# Patient Record
Sex: MALE | Race: WHITE | NOT HISPANIC OR LATINO | Employment: FULL TIME | URBAN - METROPOLITAN AREA
[De-identification: names, ages, dates, MRNs, and addresses within clinical notes are randomized per-mention and may not be internally consistent; named-entity substitution may affect disease eponyms.]

---

## 2017-12-20 RX ORDER — LISINOPRIL AND HYDROCHLOROTHIAZIDE 12.5; 1 MG/1; MG/1
1 TABLET ORAL DAILY
Qty: 90 TABLET | Refills: 1 | Status: SHIPPED | OUTPATIENT
Start: 2017-12-20

## 2021-06-09 PROBLEM — G62.9 POLYNEUROPATHY: Status: ACTIVE | Noted: 2021-06-09

## 2021-06-09 PROBLEM — G47.62 SLEEP RELATED LEG CRAMPS: Status: ACTIVE | Noted: 2021-06-09

## 2021-09-10 PROBLEM — G20 PARKINSON'S DISEASE (HCC): Status: ACTIVE | Noted: 2021-09-10

## 2023-08-03 LAB — PROSTATE SPECIFIC AG (NG/ML) IN SER/PLAS: 0.51 NG/ML (ref 0–4)

## 2023-11-09 ENCOUNTER — LAB (OUTPATIENT)
Dept: LAB | Facility: LAB | Age: 63
End: 2023-11-09
Payer: COMMERCIAL

## 2023-11-09 DIAGNOSIS — E78.5 HYPERLIPIDEMIA, UNSPECIFIED: ICD-10-CM

## 2023-11-09 DIAGNOSIS — E11.65 TYPE 2 DIABETES MELLITUS WITH HYPERGLYCEMIA (MULTI): Primary | ICD-10-CM

## 2023-11-09 LAB
ALBUMIN SERPL BCP-MCNC: 4.6 G/DL (ref 3.4–5)
ALP SERPL-CCNC: 60 U/L (ref 33–136)
ALT SERPL W P-5'-P-CCNC: 155 U/L (ref 10–52)
ANION GAP SERPL CALC-SCNC: 12 MMOL/L (ref 10–20)
AST SERPL W P-5'-P-CCNC: 74 U/L (ref 9–39)
BILIRUB SERPL-MCNC: 1.3 MG/DL (ref 0–1.2)
BUN SERPL-MCNC: 25 MG/DL (ref 6–23)
C PEPTIDE SERPL-MCNC: 1.5 NG/ML (ref 0.7–3.9)
CALCIUM SERPL-MCNC: 9.6 MG/DL (ref 8.6–10.3)
CHLORIDE SERPL-SCNC: 103 MMOL/L (ref 98–107)
CHOLEST SERPL-MCNC: 184 MG/DL (ref 0–199)
CHOLESTEROL/HDL RATIO: 3.6
CO2 SERPL-SCNC: 27 MMOL/L (ref 21–32)
CREAT SERPL-MCNC: 0.79 MG/DL (ref 0.5–1.3)
CREAT UR-MCNC: 81.3 MG/DL (ref 20–370)
FOLATE SERPL-MCNC: 10.7 NG/ML
GFR SERPL CREATININE-BSD FRML MDRD: >90 ML/MIN/1.73M*2
GLUCOSE SERPL-MCNC: 89 MG/DL (ref 74–99)
HDLC SERPL-MCNC: 50.7 MG/DL
LDLC SERPL CALC-MCNC: 117 MG/DL
MICROALBUMIN UR-MCNC: 8.2 MG/L
MICROALBUMIN/CREAT UR: 10.1 UG/MG CREAT
NON HDL CHOLESTEROL: 133 MG/DL (ref 0–149)
POTASSIUM SERPL-SCNC: 4 MMOL/L (ref 3.5–5.3)
PROT SERPL-MCNC: 6.5 G/DL (ref 6.4–8.2)
SODIUM SERPL-SCNC: 138 MMOL/L (ref 136–145)
TRIGL SERPL-MCNC: 83 MG/DL (ref 0–149)
TSH SERPL-ACNC: 1.96 MIU/L (ref 0.44–3.98)
VIT B12 SERPL-MCNC: 272 PG/ML (ref 211–911)
VLDL: 17 MG/DL (ref 0–40)

## 2023-11-09 PROCEDURE — 80053 COMPREHEN METABOLIC PANEL: CPT

## 2023-11-09 PROCEDURE — 82570 ASSAY OF URINE CREATININE: CPT

## 2023-11-09 PROCEDURE — 83036 HEMOGLOBIN GLYCOSYLATED A1C: CPT

## 2023-11-09 PROCEDURE — 82746 ASSAY OF FOLIC ACID SERUM: CPT

## 2023-11-09 PROCEDURE — 36415 COLL VENOUS BLD VENIPUNCTURE: CPT

## 2023-11-09 PROCEDURE — 80061 LIPID PANEL: CPT

## 2023-11-09 PROCEDURE — 82043 UR ALBUMIN QUANTITATIVE: CPT

## 2023-11-09 PROCEDURE — 84443 ASSAY THYROID STIM HORMONE: CPT

## 2023-11-09 PROCEDURE — 82607 VITAMIN B-12: CPT

## 2023-11-09 PROCEDURE — 84681 ASSAY OF C-PEPTIDE: CPT

## 2023-11-10 LAB
EST. AVERAGE GLUCOSE BLD GHB EST-MCNC: 140 MG/DL
HBA1C MFR BLD: 6.5 %

## 2023-12-15 ENCOUNTER — OFFICE (OUTPATIENT)
Dept: URBAN - METROPOLITAN AREA CLINIC 26 | Facility: CLINIC | Age: 63
End: 2023-12-15
Payer: COMMERCIAL

## 2023-12-15 VITALS
TEMPERATURE: 97.7 F | HEIGHT: 72 IN | WEIGHT: 273 LBS | SYSTOLIC BLOOD PRESSURE: 94 MMHG | DIASTOLIC BLOOD PRESSURE: 58 MMHG | HEART RATE: 65 BPM

## 2023-12-15 DIAGNOSIS — R11.0 NAUSEA: ICD-10-CM

## 2023-12-15 DIAGNOSIS — R10.13 EPIGASTRIC PAIN: ICD-10-CM

## 2023-12-15 DIAGNOSIS — R79.89 OTHER SPECIFIED ABNORMAL FINDINGS OF BLOOD CHEMISTRY: ICD-10-CM

## 2023-12-15 PROBLEM — R74.8 ELEVATED CK: Status: ACTIVE | Noted: 2019-09-18

## 2023-12-15 PROBLEM — G56.00 CARPAL TUNNEL SYNDROME: Status: ACTIVE | Noted: 2017-06-22

## 2023-12-15 PROBLEM — G20.A1 PARKINSON'S DISEASE (MULTI): Status: ACTIVE | Noted: 2021-09-10

## 2023-12-15 PROBLEM — I25.10 CALCIFICATION OF CORONARY ARTERY: Status: ACTIVE | Noted: 2022-12-20

## 2023-12-15 PROBLEM — G47.62 SLEEP RELATED LEG CRAMPS: Status: ACTIVE | Noted: 2021-06-09

## 2023-12-15 PROBLEM — M79.604 PAIN IN BOTH LOWER EXTREMITIES: Status: ACTIVE | Noted: 2023-12-15

## 2023-12-15 PROBLEM — R07.9 CHEST PAIN AT REST: Status: ACTIVE | Noted: 2023-12-15

## 2023-12-15 PROBLEM — R49.0 CHRONIC HOARSENESS: Status: ACTIVE | Noted: 2023-03-13

## 2023-12-15 PROBLEM — F51.04 CHRONIC INSOMNIA: Status: ACTIVE | Noted: 2023-03-13

## 2023-12-15 PROBLEM — R52 GENERALIZED PAIN: Status: ACTIVE | Noted: 2019-09-18

## 2023-12-15 PROBLEM — E66.9 OBESITY, CLASS II, BMI 35-39.9: Status: ACTIVE | Noted: 2022-12-28

## 2023-12-15 PROBLEM — K21.9 GASTROESOPHAGEAL REFLUX DISEASE: Status: ACTIVE | Noted: 2022-12-20

## 2023-12-15 PROBLEM — M79.605 PAIN IN BOTH LOWER EXTREMITIES: Status: ACTIVE | Noted: 2023-12-15

## 2023-12-15 PROBLEM — F10.10 ALCOHOL CONSUMPTION BINGE DRINKING: Status: ACTIVE | Noted: 2019-09-19

## 2023-12-15 PROBLEM — G62.9 POLYNEUROPATHY: Status: ACTIVE | Noted: 2021-06-09

## 2023-12-15 PROBLEM — I10 HYPERTENSION: Status: ACTIVE | Noted: 2018-02-08

## 2023-12-15 PROBLEM — I42.0 DILATED CARDIOMYOPATHY (MULTI): Status: ACTIVE | Noted: 2023-12-15

## 2023-12-15 PROBLEM — M11.20 CHONDROCALCINOSIS DUE TO DICALCIUM PHOSPHATE CRYSTALS: Status: ACTIVE | Noted: 2017-05-08

## 2023-12-15 PROBLEM — J34.2 DEVIATED NASAL SEPTUM: Status: ACTIVE | Noted: 2023-03-13

## 2023-12-15 PROBLEM — R53.83 FATIGUE: Status: ACTIVE | Noted: 2023-12-15

## 2023-12-15 PROBLEM — M54.12 CERVICAL RADICULOPATHY: Status: ACTIVE | Noted: 2018-08-23

## 2023-12-15 PROBLEM — R94.39 ABNORMAL NUCLEAR STRESS TEST: Status: ACTIVE | Noted: 2023-12-15

## 2023-12-15 PROBLEM — G47.33 OBSTRUCTIVE SLEEP APNEA, ADULT: Status: ACTIVE | Noted: 2019-09-19

## 2023-12-15 PROBLEM — R93.1 HIGH CORONARY ARTERY CALCIUM SCORE: Status: ACTIVE | Noted: 2023-12-15

## 2023-12-15 PROBLEM — F41.9 ANXIETY: Status: ACTIVE | Noted: 2023-12-15

## 2023-12-15 PROBLEM — M54.2 NECK PAIN: Status: ACTIVE | Noted: 2023-12-15

## 2023-12-15 PROBLEM — R06.02 SHORTNESS OF BREATH AT REST: Status: ACTIVE | Noted: 2023-12-15

## 2023-12-15 PROBLEM — N40.0 BENIGN PROSTATIC HYPERPLASIA: Status: ACTIVE | Noted: 2023-03-13

## 2023-12-15 PROBLEM — I25.84 CALCIFICATION OF CORONARY ARTERY: Status: ACTIVE | Noted: 2022-12-20

## 2023-12-15 PROCEDURE — 99213 OFFICE O/P EST LOW 20 MIN: CPT

## 2023-12-15 RX ORDER — FAMOTIDINE 40 MG/1
TABLET, FILM COATED ORAL
Qty: 30 | Refills: 3 | Status: COMPLETED
Start: 2023-12-15 | End: 2024-03-14

## 2023-12-18 ENCOUNTER — LAB (OUTPATIENT)
Dept: LAB | Facility: LAB | Age: 63
End: 2023-12-18
Payer: COMMERCIAL

## 2023-12-18 ENCOUNTER — OFFICE VISIT (OUTPATIENT)
Dept: CARDIOLOGY | Facility: CLINIC | Age: 63
End: 2023-12-18
Payer: COMMERCIAL

## 2023-12-18 ENCOUNTER — HOSPITAL ENCOUNTER (OUTPATIENT)
Dept: CARDIOLOGY | Facility: CLINIC | Age: 63
Discharge: HOME | End: 2023-12-18
Payer: COMMERCIAL

## 2023-12-18 VITALS
HEIGHT: 72 IN | BODY MASS INDEX: 36.84 KG/M2 | HEART RATE: 54 BPM | DIASTOLIC BLOOD PRESSURE: 53 MMHG | WEIGHT: 272 LBS | SYSTOLIC BLOOD PRESSURE: 93 MMHG | OXYGEN SATURATION: 98 %

## 2023-12-18 DIAGNOSIS — I10 PRIMARY HYPERTENSION: ICD-10-CM

## 2023-12-18 DIAGNOSIS — I42.0 DILATED CARDIOMYOPATHY (MULTI): Primary | ICD-10-CM

## 2023-12-18 DIAGNOSIS — I42.9 CARDIOMYOPATHY, UNSPECIFIED (MULTI): ICD-10-CM

## 2023-12-18 DIAGNOSIS — E78.2 MIXED HYPERLIPIDEMIA: ICD-10-CM

## 2023-12-18 DIAGNOSIS — R79.89 OTHER SPECIFIED ABNORMAL FINDINGS OF BLOOD CHEMISTRY: Primary | ICD-10-CM

## 2023-12-18 DIAGNOSIS — R11.0 NAUSEA: ICD-10-CM

## 2023-12-18 LAB
A1AT SERPL NEPH-MCNC: 144 MG/DL (ref 84–218)
ALBUMIN SERPL BCP-MCNC: 4.2 G/DL (ref 3.4–5)
ALP SERPL-CCNC: 69 U/L (ref 33–136)
ALT SERPL W P-5'-P-CCNC: 164 U/L (ref 10–52)
ANION GAP SERPL CALC-SCNC: 10 MMOL/L (ref 10–20)
AST SERPL W P-5'-P-CCNC: 61 U/L (ref 9–39)
BILIRUB SERPL-MCNC: 1.1 MG/DL (ref 0–1.2)
BUN SERPL-MCNC: 27 MG/DL (ref 6–23)
CALCIUM SERPL-MCNC: 9.4 MG/DL (ref 8.6–10.3)
CERULOPLASMIN SERPL-MCNC: 27.6 MG/DL (ref 20–60)
CHLORIDE SERPL-SCNC: 103 MMOL/L (ref 98–107)
CO2 SERPL-SCNC: 28 MMOL/L (ref 21–32)
CREAT SERPL-MCNC: 0.86 MG/DL (ref 0.5–1.3)
FERRITIN SERPL-MCNC: 368 NG/ML (ref 20–300)
GFR SERPL CREATININE-BSD FRML MDRD: >90 ML/MIN/1.73M*2
GLIADIN PEPTIDE IGA SER IA-ACNC: <1 U/ML
GLIADIN PEPTIDE IGG SER IA-ACNC: <1 U/ML
GLUCOSE SERPL-MCNC: 123 MG/DL (ref 74–99)
HAV IGM SER QL: NONREACTIVE
HBV CORE IGM SER QL: NONREACTIVE
HBV SURFACE AG SERPL QL IA: NONREACTIVE
HCV AB SER QL: NONREACTIVE
IRON SATN MFR SERPL: 39 % (ref 25–45)
IRON SERPL-MCNC: 166 UG/DL (ref 35–150)
POTASSIUM SERPL-SCNC: 4.3 MMOL/L (ref 3.5–5.3)
PROT SERPL-MCNC: 6.2 G/DL (ref 6.4–8.2)
SODIUM SERPL-SCNC: 137 MMOL/L (ref 136–145)
TIBC SERPL-MCNC: 425 UG/DL (ref 240–445)
TTG IGA SER IA-ACNC: <1 U/ML
TTG IGG SER IA-ACNC: <1 U/ML
UIBC SERPL-MCNC: 259 UG/DL (ref 110–370)

## 2023-12-18 PROCEDURE — 3074F SYST BP LT 130 MM HG: CPT | Performed by: NURSE PRACTITIONER

## 2023-12-18 PROCEDURE — 99213 OFFICE O/P EST LOW 20 MIN: CPT | Performed by: NURSE PRACTITIONER

## 2023-12-18 PROCEDURE — 3078F DIAST BP <80 MM HG: CPT | Performed by: NURSE PRACTITIONER

## 2023-12-18 PROCEDURE — 99213 OFFICE O/P EST LOW 20 MIN: CPT | Mod: 25 | Performed by: NURSE PRACTITIONER

## 2023-12-18 PROCEDURE — 86038 ANTINUCLEAR ANTIBODIES: CPT

## 2023-12-18 PROCEDURE — 83550 IRON BINDING TEST: CPT

## 2023-12-18 PROCEDURE — 93005 ELECTROCARDIOGRAM TRACING: CPT | Performed by: NURSE PRACTITIONER

## 2023-12-18 PROCEDURE — 86381 MITOCHONDRIAL ANTIBODY EACH: CPT

## 2023-12-18 PROCEDURE — 80053 COMPREHEN METABOLIC PANEL: CPT

## 2023-12-18 PROCEDURE — 3044F HG A1C LEVEL LT 7.0%: CPT | Performed by: NURSE PRACTITIONER

## 2023-12-18 PROCEDURE — 80074 ACUTE HEPATITIS PANEL: CPT

## 2023-12-18 PROCEDURE — 93306 TTE W/DOPPLER COMPLETE: CPT

## 2023-12-18 PROCEDURE — 82728 ASSAY OF FERRITIN: CPT

## 2023-12-18 PROCEDURE — 93010 ELECTROCARDIOGRAM REPORT: CPT | Performed by: INTERNAL MEDICINE

## 2023-12-18 PROCEDURE — 82390 ASSAY OF CERULOPLASMIN: CPT

## 2023-12-18 PROCEDURE — 82103 ALPHA-1-ANTITRYPSIN TOTAL: CPT

## 2023-12-18 PROCEDURE — 3061F NEG MICROALBUMINURIA REV: CPT | Performed by: NURSE PRACTITIONER

## 2023-12-18 PROCEDURE — 93306 TTE W/DOPPLER COMPLETE: CPT | Performed by: STUDENT IN AN ORGANIZED HEALTH CARE EDUCATION/TRAINING PROGRAM

## 2023-12-18 PROCEDURE — 36415 COLL VENOUS BLD VENIPUNCTURE: CPT

## 2023-12-18 PROCEDURE — 3049F LDL-C 100-129 MG/DL: CPT | Performed by: NURSE PRACTITIONER

## 2023-12-18 PROCEDURE — 83540 ASSAY OF IRON: CPT

## 2023-12-18 PROCEDURE — 86258 DGP ANTIBODY EACH IG CLASS: CPT

## 2023-12-18 PROCEDURE — 86364 TISS TRNSGLTMNASE EA IG CLAS: CPT

## 2023-12-18 RX ORDER — MORPHINE SULFATE 15 MG/1
15 TABLET, FILM COATED, EXTENDED RELEASE ORAL
COMMUNITY

## 2023-12-18 RX ORDER — LOSARTAN POTASSIUM AND HYDROCHLOROTHIAZIDE 12.5; 1 MG/1; MG/1
1 TABLET ORAL
COMMUNITY
End: 2024-04-22 | Stop reason: DRUGHIGH

## 2023-12-18 RX ORDER — METHOCARBAMOL 500 MG/1
500 TABLET, FILM COATED ORAL
COMMUNITY
Start: 2021-04-01 | End: 2024-04-22 | Stop reason: ALTCHOICE

## 2023-12-18 RX ORDER — METFORMIN HYDROCHLORIDE 1000 MG/1
1000 TABLET ORAL
COMMUNITY
Start: 2018-11-20

## 2023-12-18 RX ORDER — GLIMEPIRIDE 4 MG/1
2 TABLET ORAL
COMMUNITY
End: 2024-01-08 | Stop reason: WASHOUT

## 2023-12-18 NOTE — PROGRESS NOTES
Stalin Shah is a 63 y.o. male     History Of Present Illness   Mr Shah is a 63 year old morbidly obese male with dilated cardiomyopathy, T2DM with neuropathy, hypertension, hyperlipidemia, MATTHEW, BPH, chronic back pain, here for a follow up of his hypertension, hyperlipidemia, dilated cardiomyopathy . He reports excellent control of his BP since retiring. He is followed by sleep medicine for his MATTHEW.   Mr Shah is here for cardiac clearance for neck and back surgery.  He denies any complaints of chest pain shortness of breath, lower extremity edema, dizziness or syncope.  Approx. Two months ago, he was complaining of myalgia pain and his statin was stopped.  Most recently, he underwent labs and was found to have elevated liver enzymes.  He is now followed by GI and will be under going ultrasound.      Prior to this appt, he underwent an echocardiogram.   Social HX          Family HX  No family history on file.       Review Of Systems   Constitutional: feeling poorly, but~not feeling tired.   Eyes: no eyesight problems.   ENT: no hearing loss~and~no nosebleeds.   Cardiovascular: no intermittent leg claudication,~no chest pain,~no tightness or heavy pressure,~no shortness of breath,~no palpitations,~no lower extremity edema,~the heart rate was not slow~and~as noted in HPI.   Respiratory: no chronic cough,~no shortness of breath~and~no shortness of breath during exertion.   Gastrointestinal: + GERD, +abdominal discomfort  Genitourinary: no urinary frequency~and~no hematuria.   Musculoskeletal: arthralgias, + neck and back pain, limb pain~and~difficulty walking,   but~no limb swelling.   Skin: no skin rashes.   Neurological: no seizures,~no frequent falls,~no headaches,~no dizziness,~no tingling,~no numbness,~no fainting~and~no limb weakness.   Psychiatric: no depression~and~not suicidal.   All other systems have been reviewed and are negative for complaint.       Allergies  Allergies   Allergen Reactions     Lisinopril Unknown    Sitagliptin GI Upset          Vitals  There were no vitals taken for this visit.        Physical Exam  Constitutional: alert and in no acute distress.   Eyes: no erythema, swelling or discharge from the eye .   Neck: neck is supple, symmetric, trachea midline, no masses ~and~no thyromegaly .   Pulmonary: no increased work of breathing or signs of respiratory distress ~and~lungs clear to auscultation.  Auscultation of the lungs revealed no expiratory wheezing,~normal expiratory time~and~no inspiratory wheezing. no rales or crackles were heard bilaterally. no rhonchi. no friction rub. no wheezing. no diminished breath sounds. no bronchial breath sounds.   Cardiovascular: carotid pulses 2+ bilaterally with no bruit  right carotid pulse 2+,~no bruit heard over the right carotid,~left carotid pulse 2+~and~no bruit heard over the left carotid,~JVP was normal,~no thrills ,~regular rhythm, normal S1 and S2, no murmurs  the heart rate was normal normal S1,~normal S2,~no S3,~no S4 no murmurs were heard.,~pedal pulses 2+ bilaterally  posterior tibialis pulse was 2+ on the right,~posterior tibialis pulse was 2+ on the left,~dorsalis pedis pulse was 2+ on the right,~dorsalis pedis pulse was 2+ on the left,~no edema ~and~ varicosities noted bilaterally. Left > right.   Abdomen: abdomen non-tender, no masses ~and~no hepatomegaly .   Skin: skin warm and dry, normal skin turgor ~. skin tear to left lower leg.   Psychiatric judgment and insight is normal ~and~oriented to person, place and time .                 Labs     AST 74  BILIRUBIN 1.3      BUN 25  CR 0.79  GFR >90    GLUCOSE 89  K 4.0  EKG Findings  EK23 sinus bradycardia   Inferior infarct  56        Cardiac Service Results:  CARDIAC CATHETERIZATION:  1. Mild, nonobstructive coronary artery disease.   2. Minimally elevated left heart filling pressures.   3. Mild to moderately depressed LV systolic function.           Assessment/Plan       Mr Shah is here for cardiac clearance for neck and back surgery.  EKG shows sinus bradycardia -56  BP is 93/53.  He states he is taking his antihypertensive twice a day.   He is instructed to only take this once a day.  His liver enzymes are elevated and he will be under going a liver ultrasound.  Prior to this appt, he underwent an echocardiogram,.  Preliminary results show an EF of 52% and mild AI.    He does have known LVH/cardiomyopathy.  He is cleared for surgery with a low risk of cardiovascular events in the perioperative period.  He is to stop asa 5 days prior to his surgery.  He will follow up with me in April or sooner for any questions or concerns.

## 2023-12-19 LAB
ANA PATTERN: ABNORMAL
ANA SER QL HEP2 SUBST: POSITIVE
ANA TITR SER IF: ABNORMAL {TITER}
MITOCHONDRIA AB SER QL IF: NEGATIVE

## 2023-12-20 LAB
ATRIAL RATE: 56 BPM
P AXIS: 52 DEGREES
P OFFSET: 199 MS
P ONSET: 145 MS
PR INTERVAL: 158 MS
Q ONSET: 224 MS
QRS COUNT: 9 BEATS
QRS DURATION: 98 MS
QT INTERVAL: 426 MS
QTC CALCULATION(BAZETT): 411 MS
QTC FREDERICIA: 416 MS
R AXIS: 10 DEGREES
T AXIS: 18 DEGREES
T OFFSET: 437 MS
VENTRICULAR RATE: 56 BPM

## 2023-12-26 ENCOUNTER — OFFICE VISIT (OUTPATIENT)
Dept: SLEEP MEDICINE | Facility: CLINIC | Age: 63
End: 2023-12-26
Payer: COMMERCIAL

## 2023-12-26 DIAGNOSIS — G47.33 OBSTRUCTIVE SLEEP APNEA, ADULT: Primary | ICD-10-CM

## 2023-12-26 LAB
EJECTION FRACTION APICAL 4 CHAMBER: 61.3
EJECTION FRACTION: 54
LEFT ATRIUM VOLUME AREA LENGTH INDEX BSA: 33.4
LEFT VENTRICLE INTERNAL DIMENSION DIASTOLE: 5.6 (ref 3.5–6)
LEFT VENTRICULAR OUTFLOW TRACT DIAMETER: 2.4
MITRAL VALVE E/A RATIO: 1.26
MITRAL VALVE E/E' RATIO: 5.15
RIGHT VENTRICLE FREE WALL PEAK S': 14.2
RIGHT VENTRICLE PEAK SYSTOLIC PRESSURE: 30.9
TRICUSPID ANNULAR PLANE SYSTOLIC EXCURSION: 2.7

## 2023-12-26 PROCEDURE — 99213 OFFICE O/P EST LOW 20 MIN: CPT | Performed by: NURSE PRACTITIONER

## 2023-12-26 PROCEDURE — 3044F HG A1C LEVEL LT 7.0%: CPT | Performed by: NURSE PRACTITIONER

## 2023-12-26 PROCEDURE — 1036F TOBACCO NON-USER: CPT | Performed by: NURSE PRACTITIONER

## 2023-12-26 PROCEDURE — 3049F LDL-C 100-129 MG/DL: CPT | Performed by: NURSE PRACTITIONER

## 2023-12-26 PROCEDURE — 3061F NEG MICROALBUMINURIA REV: CPT | Performed by: NURSE PRACTITIONER

## 2023-12-26 NOTE — PROGRESS NOTES
Patient: Stalin Shah    [id number]  : 1960 -- AGE 63 y.o.    Provider: SAVANNAH Salazar     Location [facility]   Service Date: 2023              [facility]  Followup Visit Note    Virtual or Telephone Consent  An interactive audio and video telecommunication system which permits real time communications between the patient (at the originating site) and provider (at the distant site) was utilized to provide this telehealth service.   Verbal consent was requested and obtained from Stalin Shah on this date, 23 for a telehealth visit.     HISTORY OF PRESENT ILLNESS       HISTORY OF PRESENT ILLNESS   Stalin Shah is a 63 y.o. male with past medical history of MATTHEW who presents to a [facility] for followup.     PAST SLEEP HISTORY  Received results of sleep study dated 2020 at  [facility] lab showing severe sleep apnea with an AHI of 75.9 and SpO2 tim of 31%. CPAP was titrated from 4 cwp to a maximum of 14 cwp. At 14 cwp, the AHI was 8. Recommendation is for CPAP at 14 cwp with DreamWear nasal mask. When I called Stalin with updates after his testing, He stated he had been wearing his CPAP with DreamWear mask since his testing. [place] he was able to rest much better with mask style. Felt his nose and mouth had been dry. We adjusted his humidity to 5 over the phone. Stated his pressure is set for 11 currently. We planned adjust pressure in clinic at follow up. I asked him to bring all equipment to visit. Would change to auto if device capable. Recommended he use chin strap that was provided at sleep study as well. He verbalized understanding and is agreeable.     CURRENT SLEEP HISTORY    On today's visit, the patient reports wearing his machine more often than he used to. Feels better when he wears it. Notes he has lost some more weight. He had spinal fusion Friday and is planning  for knee surgery tomorrow. He notes he is in pain, hoping to adjust medications with surgeon. He slept in his chair with the PAP machine yesterday. Getting supplies regularly via [facility]. Planning to move to [place] in the next 6 mo or so. Going through water tank quickly on PAP. Asking if he could ever come off PAP. Sister reports he continues to snore loudly without PAP despite weight loss.     RLS Followup:  denies     Insomnia follow up:  Bedtime:  Sleep Latency:  Awakenings:  Wake time:  Naps:       ESS: No data recorded   RAE:  FOSQ:    REVIEW OF SYSTEMS     REVIEW OF SYSTEMS  Review of Systems   All other systems reviewed and are negative.      ALLERGIES AND MEDICATIONS     ALLERGIES  Allergies   Allergen Reactions    Lisinopril Unknown    Sitagliptin GI Upset       MEDICATIONS  Current Outpatient Medications   Medication Sig Dispense Refill    glimepiride (Amaryl) 4 mg tablet 0.5 tablets (2 mg).      losartan-hydrochlorothiazide (Hyzaar) 100-12.5 mg tablet Take 1 tablet by mouth once daily.      metFORMIN (Glucophage) 1,000 mg tablet Take 1 tablet (1,000 mg) by mouth 2 times a day with meals.      methocarbamol (Robaxin) 500 mg tablet Take 1 tablet (500 mg) by mouth.      morphine CR (MS Contin) 15 mg 12 hr tablet 1 tablet (15 mg).       No current facility-administered medications for this visit.       PPAST MEDICAL HISTORY  History reviewed. No pertinent past medical history.    PAST SURGICAL HISTORY:  Past Surgical History:   Procedure Laterality Date    HERNIA REPAIR  01/24/2017    Hernia Repair    KNEE ARTHROSCOPY W/ DEBRIDEMENT  01/20/2017    Arthroscopy Knee       FAMILY HISTORY  No family history on file.    FAMILY HISTORY: No changes since previous visit. Otherwise non-contributory as charted.       SOCIAL HISTORY  He  reports that he has never smoked. He has never used smokeless tobacco. No history on file for alcohol use and drug use.       PHYSICAL EXAM     VITAL SIGNS: There were no vitals taken  for this visit.     PREVIOUS WEIGHTS:  Wt Readings from Last 3 Encounters:   12/18/23 123 kg (272 lb)   06/26/23 127 kg (279 lb)   02/20/23 129 kg (284 lb)       Physical Exam    CONSTITUTIONAL: Patient appears well developed and well nourished.   GENERAL: This is a healthy appearing adult who appears stated age. The patient is alert and appropriately verbally conversant without hoarseness. This patient is in no apparent distress. Not acutely ill or toxic appearing.   FACE: The face was inspected and no cutaneous masses or lesions were visualized. There was no erythema or edema noted. Facial movement was symmetric.   EYES: Extra-ocular muscle function was intact. No nystagmus was observed. Pupils were equal.   CRANIAL NERVES: Cranial nerves II, III, IV, and VI were noted to be intact via extra-ocular muscle movement testing. Cranial nerve VII noted to be intact and symmetric by facial movement. Cranial nerve XII noted to be intact by active and symmetric tongue movement.   NOSE: Examination of the nose revealed the nasal dorsum to be midline.   ORAL CAVITY: Examination of the oral cavity limited due to virtual nature of visit. The lips were free of lesion. Gums were free of inflammation.   OROPHARYNX: Difficult to visualize due to virtual exam  EARS: Examination of the ears revealed that the auricles were normally formed with no lesions.   NECK: No skin lesions or inflammatory processes were detected on visual inspection.  RESPIRATORY: Symmetrical inspiration and expiration and chest wall expansion, no use of accessory muscles to breathe, no stridor.  NEUROLOGICAL: Mentation is clear. Alert and oriented to time, place, and person. Answering questions appropriately.  PSYCHIATRIC: Normal affect and appropriate behavior. Mood is without obvious agitation or disturbance. No evidence of obvious depressive behaviors.      RESULTS/DATA     Bicarbonate (mmol/L)   Date Value   12/18/2023 28   11/09/2023 27   12/20/2022 32    03/24/2022 29   05/03/2021 29     Iron (ug/dL)   Date Value   12/18/2023 166 (H)     % Saturation (%)   Date Value   12/18/2023 39     TIBC (ug/dL)   Date Value   12/18/2023 425     Ferritin (ng/mL)   Date Value   12/18/2023 368 (H)       PAP Adherence:                       [facility]- transferred to [facility]                 F30 I wide                 Due for supplies 3-15-24        ASSESSMENT/PLAN     Mr. Shah is a 63 y.o. male and He returns in followup to the [facility] for MATTHEW.    Problem List and Orders  Problem List Items Addressed This Visit             ICD-10-CM    Obstructive sleep apnea, adult - Primary G47.33     2/25/2020 at  [facility] showing severe sleep apnea with an AHI of 75.9 and SpO2 tim of 31%. CPAP was titrated from 4 cwp to a maximum of 14 cwp. At 14 cwp, the AHI was 8. Recommendation is for CPAP at 14 cwp with DreamWear nasal mask  Has lost 40 lbs or so since this time  Consider repeat testing, continues to snore off PAP however  Supply order updated today  Adjusted humidity to 3 today         Relevant Orders    Positive Airway Pressure (PAP) Therapy       Disposition    Return to clinic in 6 months

## 2023-12-26 NOTE — ASSESSMENT & PLAN NOTE
2/25/2020 at [facility] lab showing severe sleep apnea with an AHI of 75.9 and SpO2 tim of 31%. CPAP was titrated from 4 cwp to a maximum of 14 cwp. At 14 cwp, the AHI was 8. Recommendation is for CPAP at 14 cwp with DreamWear nasal mask  Has lost 40 lbs or so since this time  Consider repeat testing, continues to snore off PAP however  Supply order updated today  Adjusted humidity to 3 today

## 2023-12-31 DIAGNOSIS — E11.69 TYPE 2 DIABETES MELLITUS WITH OTHER SPECIFIED COMPLICATION, UNSPECIFIED WHETHER LONG TERM INSULIN USE (MULTI): Primary | ICD-10-CM

## 2024-01-08 RX ORDER — GLIMEPIRIDE 1 MG/1
1 TABLET ORAL DAILY
Qty: 90 TABLET | Refills: 3 | Status: SHIPPED | OUTPATIENT
Start: 2024-01-08

## 2024-01-24 ENCOUNTER — OFFICE (OUTPATIENT)
Dept: URBAN - METROPOLITAN AREA CLINIC 26 | Facility: CLINIC | Age: 64
End: 2024-01-24

## 2024-01-24 ENCOUNTER — LAB (OUTPATIENT)
Dept: LAB | Facility: LAB | Age: 64
End: 2024-01-24
Payer: COMMERCIAL

## 2024-01-24 VITALS
TEMPERATURE: 98.4 F | DIASTOLIC BLOOD PRESSURE: 81 MMHG | HEART RATE: 70 BPM | HEIGHT: 72 IN | SYSTOLIC BLOOD PRESSURE: 133 MMHG | WEIGHT: 279 LBS

## 2024-01-24 DIAGNOSIS — K76.0 FATTY (CHANGE OF) LIVER, NOT ELSEWHERE CLASSIFIED: Primary | ICD-10-CM

## 2024-01-24 DIAGNOSIS — K76.0 FATTY (CHANGE OF) LIVER, NOT ELSEWHERE CLASSIFIED: ICD-10-CM

## 2024-01-24 DIAGNOSIS — R79.89 OTHER SPECIFIED ABNORMAL FINDINGS OF BLOOD CHEMISTRY: ICD-10-CM

## 2024-01-24 DIAGNOSIS — R10.84 GENERALIZED ABDOMINAL PAIN: ICD-10-CM

## 2024-01-24 DIAGNOSIS — K59.09 OTHER CONSTIPATION: ICD-10-CM

## 2024-01-24 PROCEDURE — 99213 OFFICE O/P EST LOW 20 MIN: CPT

## 2024-01-24 PROCEDURE — 36415 COLL VENOUS BLD VENIPUNCTURE: CPT

## 2024-01-24 PROCEDURE — 86015 ACTIN ANTIBODY EACH: CPT

## 2024-01-24 PROCEDURE — 81256 HFE GENE: CPT

## 2024-01-29 LAB
ELECTRONICALLY SIGNED BY: NORMAL
HFE GENE MUT TESTED BLD/T: NORMAL
HFE P.C282Y BLD/T QL: NORMAL
HFE P.H63D BLD/T QL: NORMAL
SMOOTH MUSCLE AB SER QL IF: NEGATIVE

## 2024-03-14 ENCOUNTER — OFFICE (OUTPATIENT)
Dept: URBAN - METROPOLITAN AREA CLINIC 26 | Facility: CLINIC | Age: 64
End: 2024-03-14

## 2024-03-14 VITALS
TEMPERATURE: 97.8 F | DIASTOLIC BLOOD PRESSURE: 78 MMHG | HEIGHT: 72 IN | SYSTOLIC BLOOD PRESSURE: 129 MMHG | WEIGHT: 273 LBS | HEART RATE: 72 BPM

## 2024-03-14 DIAGNOSIS — Z09 ENCOUNTER FOR FOLLOW-UP EXAMINATION AFTER COMPLETED TREATMEN: ICD-10-CM

## 2024-03-14 DIAGNOSIS — K76.0 FATTY (CHANGE OF) LIVER, NOT ELSEWHERE CLASSIFIED: ICD-10-CM

## 2024-03-14 DIAGNOSIS — Z86.010 PERSONAL HISTORY OF COLONIC POLYPS: ICD-10-CM

## 2024-03-14 DIAGNOSIS — F10.11 ALCOHOL ABUSE, IN REMISSION: ICD-10-CM

## 2024-03-14 DIAGNOSIS — R79.89 OTHER SPECIFIED ABNORMAL FINDINGS OF BLOOD CHEMISTRY: ICD-10-CM

## 2024-03-14 PROCEDURE — 99214 OFFICE O/P EST MOD 30 MIN: CPT | Performed by: CLINICAL NURSE SPECIALIST

## 2024-03-20 DIAGNOSIS — I42.0 DILATED CARDIOMYOPATHY (MULTI): ICD-10-CM

## 2024-03-20 DIAGNOSIS — I10 PRIMARY HYPERTENSION: Primary | ICD-10-CM

## 2024-03-20 RX ORDER — LOSARTAN POTASSIUM AND HYDROCHLOROTHIAZIDE 12.5; 5 MG/1; MG/1
1 TABLET ORAL 2 TIMES DAILY
Qty: 180 TABLET | Refills: 3 | Status: SHIPPED | OUTPATIENT
Start: 2024-03-20 | End: 2024-04-22 | Stop reason: SDUPTHER

## 2024-04-04 ENCOUNTER — APPOINTMENT (OUTPATIENT)
Dept: SLEEP MEDICINE | Facility: CLINIC | Age: 64
End: 2024-04-04
Payer: COMMERCIAL

## 2024-04-08 ENCOUNTER — LAB (OUTPATIENT)
Dept: LAB | Facility: LAB | Age: 64
End: 2024-04-08
Payer: COMMERCIAL

## 2024-04-08 DIAGNOSIS — R79.89 OTHER SPECIFIED ABNORMAL FINDINGS OF BLOOD CHEMISTRY: Primary | ICD-10-CM

## 2024-04-08 DIAGNOSIS — K76.0 FATTY (CHANGE OF) LIVER, NOT ELSEWHERE CLASSIFIED: ICD-10-CM

## 2024-04-08 LAB
ALBUMIN SERPL BCP-MCNC: 3.9 G/DL (ref 3.4–5)
ALP SERPL-CCNC: 75 U/L (ref 33–136)
ALT SERPL W P-5'-P-CCNC: 172 U/L (ref 10–52)
ANION GAP SERPL CALC-SCNC: 10 MMOL/L (ref 10–20)
AST SERPL W P-5'-P-CCNC: 55 U/L (ref 9–39)
BILIRUB SERPL-MCNC: 0.8 MG/DL (ref 0–1.2)
BUN SERPL-MCNC: 21 MG/DL (ref 6–23)
CALCIUM SERPL-MCNC: 8.9 MG/DL (ref 8.6–10.3)
CHLORIDE SERPL-SCNC: 103 MMOL/L (ref 98–107)
CO2 SERPL-SCNC: 27 MMOL/L (ref 21–32)
CREAT SERPL-MCNC: 0.71 MG/DL (ref 0.5–1.3)
EGFRCR SERPLBLD CKD-EPI 2021: >90 ML/MIN/1.73M*2
ERYTHROCYTE [DISTWIDTH] IN BLOOD BY AUTOMATED COUNT: 13.4 % (ref 11.5–14.5)
GLUCOSE SERPL-MCNC: 156 MG/DL (ref 74–99)
HCT VFR BLD AUTO: 42.6 % (ref 41–52)
HGB BLD-MCNC: 14.3 G/DL (ref 13.5–17.5)
INR PPP: 1 (ref 0.9–1.1)
MCH RBC QN AUTO: 30.3 PG (ref 26–34)
MCHC RBC AUTO-ENTMCNC: 33.6 G/DL (ref 32–36)
MCV RBC AUTO: 90 FL (ref 80–100)
NRBC BLD-RTO: 0 /100 WBCS (ref 0–0)
PLATELET # BLD AUTO: 242 X10*3/UL (ref 150–450)
POTASSIUM SERPL-SCNC: 4.3 MMOL/L (ref 3.5–5.3)
PROT SERPL-MCNC: 6.1 G/DL (ref 6.4–8.2)
PROTHROMBIN TIME: 10.8 SECONDS (ref 9.8–12.8)
RBC # BLD AUTO: 4.72 X10*6/UL (ref 4.5–5.9)
SODIUM SERPL-SCNC: 136 MMOL/L (ref 136–145)
WBC # BLD AUTO: 2.8 X10*3/UL (ref 4.4–11.3)

## 2024-04-08 PROCEDURE — 85027 COMPLETE CBC AUTOMATED: CPT

## 2024-04-08 PROCEDURE — 80053 COMPREHEN METABOLIC PANEL: CPT

## 2024-04-08 PROCEDURE — 85610 PROTHROMBIN TIME: CPT

## 2024-04-08 PROCEDURE — 36415 COLL VENOUS BLD VENIPUNCTURE: CPT

## 2024-04-09 PROBLEM — G47.30 SLEEP APNEA: Status: ACTIVE | Noted: 2024-04-09

## 2024-04-09 PROBLEM — M79.606 PAIN OF LOWER EXTREMITY: Status: ACTIVE | Noted: 2024-04-09

## 2024-04-09 PROBLEM — Z86.010 HISTORY OF ADENOMATOUS POLYP OF COLON: Status: ACTIVE | Noted: 2022-04-17

## 2024-04-09 PROBLEM — K76.0 FATTY LIVER: Status: ACTIVE | Noted: 2024-04-09

## 2024-04-09 PROBLEM — M19.90 ARTHRITIS: Status: ACTIVE | Noted: 2024-04-09

## 2024-04-09 PROBLEM — F10.11 HISTORY OF ALCOHOL ABUSE: Status: ACTIVE | Noted: 2024-04-09

## 2024-04-18 ENCOUNTER — OFFICE (OUTPATIENT)
Dept: URBAN - METROPOLITAN AREA CLINIC 26 | Facility: CLINIC | Age: 64
End: 2024-04-18

## 2024-04-18 VITALS
HEIGHT: 72 IN | DIASTOLIC BLOOD PRESSURE: 80 MMHG | WEIGHT: 276 LBS | SYSTOLIC BLOOD PRESSURE: 118 MMHG | HEART RATE: 85 BPM

## 2024-04-18 DIAGNOSIS — K75.81 NONALCOHOLIC STEATOHEPATITIS (NASH): ICD-10-CM

## 2024-04-18 DIAGNOSIS — Z09 ENCOUNTER FOR FOLLOW-UP EXAMINATION AFTER COMPLETED TREATMEN: ICD-10-CM

## 2024-04-18 DIAGNOSIS — R79.89 OTHER SPECIFIED ABNORMAL FINDINGS OF BLOOD CHEMISTRY: ICD-10-CM

## 2024-04-18 DIAGNOSIS — Z86.010 PERSONAL HISTORY OF COLONIC POLYPS: ICD-10-CM

## 2024-04-18 PROCEDURE — 99213 OFFICE O/P EST LOW 20 MIN: CPT | Performed by: CLINICAL NURSE SPECIALIST

## 2024-04-21 NOTE — PROGRESS NOTES
" Patient: Stalin Shah    [id number]  : 1960 -- AGE 64 y.o.    Provider: Frederick Miller MD     Location [facility]   Service Date: 2024              [facility]  New Visit Note      Virtual or Telephone Consent  An interactive audio and video telecommunication system which permits real time communications between the patient (at the originating site) and provider (at the distant site) was utilized to provide this telehealth service.   Verbal consent was requested and obtained from Stalin Shah on this date, 24 for a telehealth visit.   I verified the patient's identity and physical location in [place].  If this is a new patient to me, I informed the patient of my name and type of active [place] license that I hold.      The patient's referring provider is: No ref. provider found    HPI:  Stalin Shah is a 64 y.o. male with very severe MATTHEW on CPAP with history of insomnia PMH notable for dilated cardiomyopathy, HTN, HLD, deviated nasal septum, obesity, DM-2, GERD, fatty liver, BPH, left renal mass, history of alcohol abuse, anxiety, chronic pain, Parkinson's disease, polyneuropathy, and sleep-related leg cramps, who presents today for follow-up. He is a new patient to me, but was last seen by my sleep medicine colleague, SAVANNAH Alonso, on 2023.    He is moving to [place] on May 1.    Using CPAP \"more than ever\" now. Sometimes has to adjust his mask in the night. Does not always use it all night long and sometimes dozes off for an hour before putting his mask on.    Self-adjusted his device's humidity setting and lowered it from level 5 to level 3. Sometimes has dry mouth.     Is tired in the daytime despite using CPAP, but is recovering from spinal fusion surgery on 2023. Still cannot fully turn his neck to the sides and has shoulder pain. Saw the surgeon again, had x-rays, told they were fine, also had a CT scan and MRI, " will be seeing his surgeon to review the shoulder imaging results.     More fatigue than sleepiness. Trying to get back into exercising regularly and staying active. Exercised for an hour yesterday.    He is agreeable to having his CPAP settings increased.    Has snoring without CPAP. Lives with sister.    In the last week he had been having some trouble swallowing when lying in bed watching TV, lying with his upper body elevated around 45 degrees. Will talk to his PCP. No trouble swallowing food or water. Feels like it is a phlegm issue and he has to work at it to swallow.    Mask: full face mask, has a new one he will use. Has filters and will order new ones.    NIGHTTIME SYMPTOMS:   Snoring: at baseline, yes  Dream enactment: No  Sleep paralysis: No  Hypnagogic/hypnopompic hallucinations: No    DAYTIME SYMPTOMS  Daytime sleepiness: no  Fatigue: Yes  Dozing: no  Feeling sleepy while driving: Denies      SLEEP HABITS:   Bedtime: sometimes as early as 5 pm to watch TV, sleep latency 10-11 pm, occasionally as late as midnight  Wake time: by 6 am  Napping: no  Total estimated sleep per 24 hrs: 6 hours, wants to aim for 7 hours/night    PRIOR SLEEP STUDIES:  Split night PSG 2/25/2020: weight 315 lbs, BMI 43.1. Severe MATTHEW - AHI 76/h, SpO2 tim 31%, with vast majority of respiratory events consisting of hypopneas. CPAP tested from 4-14 cm H2O. AHI reduced to 8/h at 14 cm H2O and sleep was best consolidated at this setting. Mask was DreamWear nasal mask. No PLMS during diagnostic portion, but frequent PLMS with CPAP with an index of 53/h with essentially no associated arousals. There were PACs and PVCs.  -report reviewed personally by me.    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     Chronic hoarseness    Deviated nasal septum    Calcification of coronary artery    Dilated cardiomyopathy (Multi)    High coronary artery calcium score    Elevated CK    Fatigue    Gastroesophageal reflux disease    Generalized pain    Hyperlipidemia    Hypertension    Chronic insomnia    Left renal mass    Low back pain    Neck pain    Obesity, Class II, BMI 35-39.9    Obstructive sleep apnea, adult    Pain in both lower extremities    Parkinson's disease (Multi)    Polyneuropathy    Shortness of breath at rest    Sleep related leg cramps    Type 2 diabetes mellitus (Multi)    Fatty liver    Arthritis    History of alcohol abuse    History of adenomatous polyp of colon    Pain of lower extremity    Sleep apnea     Past Surgical History:   Procedure Laterality Date    CERVICAL FUSION  2023    HERNIA REPAIR  2017    Hernia Repair    KNEE ARTHROSCOPY W/ DEBRIDEMENT  2017    Arthroscopy Knee    LUMBAR SPINE SURGERY       Current Outpatient Medications   Medication Sig Dispense Refill    glimepiride (Amaryl) 1 mg tablet TAKE 1 TABLET DAILY 90 tablet 3    losartan-hydrochlorothiazide (Hyzaar) 100-12.5 mg tablet Take 1 tablet by mouth once daily.      losartan-hydrochlorothiazide (Hyzaar) 50-12.5 mg tablet TAKE 1 TABLET TWICE A  tablet 3    metFORMIN (Glucophage) 1,000 mg tablet Take 1 tablet (1,000 mg) by mouth 2 times a day with meals.      methocarbamol (Robaxin) 500 mg tablet Take 1 tablet (500 mg) by mouth.      morphine CR (MS Contin) 15 mg 12 hr tablet 1 tablet (15 mg).       No current facility-administered medications for this visit.     Allergies   Allergen Reactions    Lisinopril Unknown    Sitagliptin GI Upset       FAMILY HISTORY OF SLEEP DISORDERS: sister may have sleep apnea  Family History   Problem Relation Name Age of Onset    Cancer Mother           at 73 from blood clot after surgery    Diabetes Father           at 57 from complications from diabetes    Sleep apnea  Brother Jason         was morbidly obese       SOCIAL HISTORY  Employment: retired, on social security/disability  Lives with: sister  Alcohol: none anymore, stopped a year ago, was drinking more frequently  Cigarettes: no  Illicits: no, never  Caffeine: 5-6 cups of coffee/day     ROS: 12 point ROS positive for leg cramps - hydrates well, shoulder pain (takes magnesium and eats more bananas, and takes cyclobenzaprine at bedtime. Positive for blurry vision with glasses - got a new prescription, which helps. Negative for nasal congestion, SOB, heartburn at night, chest pain, anxiety/depression, neck masses, or rashes.    PHYSICAL EXAMINATION:   Patient reports continued weight loss and that he is now 266 lbs  General: Awake. Alert. Comfortable. No apparent distress.   Speech: Normal  Comprehension: Normal  Mood: Stable  Affect: Appropriate  Eyes:   Eyelids: normal            ENT:  Unable to assess patency of nasal passageways or for presence of nasal septum deviation. Unable to clearly view posterior oropharynx to assess tonsils, uvula, and Summers tongue score.  Tongue scalloping is present, tongue is mildly enlarged. Retrognathia is not present. Dentition is good.           Neck:          Circumference: mildly increased in caliber  Cardiac:  unable to assess pulses or cardiac rate/rhythm.   Pul:          Normal respiratory effort   Abd:         obese  Neuro: Alert, well-oriented. Cranial nerves II-XII grossly normal and symmetric.  Moves all limbs symmetrically with no evidence of significant focal weakness. No abnormal movements noted. Normal gait      CPAP download:  Device: [employer] 2 autoCPAP  DME: [facility]  Setup date: 2021  Date range: 2024-2024  Days used: 79%  Days used >4 hours: 60%  Average usage (days used): 5 hours 22 minutes   Settin-12 cm H2O, A-flex 3  Pressure: 90th %ile 11.3 cm H2O, mean 8.8 cm H2O  Avg time per day in large leak: 10 min 44 sec  AHI: 9.4 - HI 4.8, OAI 2.6, DONNA 2.0,  RERA index 1.2. flow limitation index 0.7, vibratory snore index 1.1  6.9% of night in periodic breathing. Per download review, his device sometimes goes up to the max setting of 12 cm H2O, suggesting the need for higher pressures.  Humidity set at 3      LABS/DIAGNOSTICS:  Lab Results   Component Value Date    HGB 14.3 04/08/2024    CO2 27 04/08/2024    TSH 1.96 11/09/2023    HGBA1C 6.5 (H) 11/09/2023    FERRITIN 368 (H) 12/18/2023    IRON 166 (H) 12/18/2023    TIBC 425 12/18/2023    IRONSAT 39 12/18/2023    [id number] 272 11/09/2023        Echo 12/18/2023: LVEF low normal at 50-55%.  Spectral Doppler shows normal pattern of LV diastolic filling.  Slightly elevated RVSP.        ASSESSMENT AND PLAN: Mr. Stalin Shah is a 64 y.o. male with a history of severe MATTHEW on CPAP. He still has fatigue despite using CPAP, but compliance could be better, and AHI reduced to mild range of sleep apnea severity. Aside from suboptimally-treated MATTHEW, his fatigue may also be related to his other medical issues, I.e. his chronic pain and possibly from medication effect from his muscle relaxant and pain killer, both of which he takes only at night.      #MATTHEW  -Rx to DME ([facility]) to increase pressures from 6-12 to 8-18 cm H2O  -pt will adjust his humidity setting  -We discussed the risk factors for sleep apnea, and potential long-term complications of untreated MATTHEW, including cardiovascular and metabolic complications.   -pt is motivated to improve his CPAP compliance and get 7 hours of sleep per night instead of 6    #fatigue  -pt to improve CPAP compliance and I will increase his pressure range on his device, which should better control his sleep apnea  -pt following up with his surgeon regarding his shoulder pain    #nocturnal leg cramps  -pt hydrating well and treating with cyclobenzaprine, magnesium, eating bananas    #obesity  -pt exercising and working on weight loss    All of the above was discussed with the patient in detail.  He voiced an understanding of the above and was agreeable to proceed further as advised.     Around  50 minutes  were spent on this encounter, including time reviewing the chart, conducting the H&P, counseling the patient, and documenting/placing orders.    FOLLOW UP:  PRN. He knows to find a new sleep provider when he moves to [place].

## 2024-04-22 ENCOUNTER — OFFICE VISIT (OUTPATIENT)
Dept: CARDIOLOGY | Facility: CLINIC | Age: 64
End: 2024-04-22
Payer: COMMERCIAL

## 2024-04-22 ENCOUNTER — OFFICE VISIT (OUTPATIENT)
Dept: SLEEP MEDICINE | Facility: CLINIC | Age: 64
End: 2024-04-22
Payer: COMMERCIAL

## 2024-04-22 VITALS
BODY MASS INDEX: 36.75 KG/M2 | OXYGEN SATURATION: 97 % | DIASTOLIC BLOOD PRESSURE: 75 MMHG | SYSTOLIC BLOOD PRESSURE: 114 MMHG | HEART RATE: 65 BPM | WEIGHT: 271 LBS

## 2024-04-22 DIAGNOSIS — G47.33 OSA (OBSTRUCTIVE SLEEP APNEA): Primary | ICD-10-CM

## 2024-04-22 DIAGNOSIS — R53.83 FATIGUE, UNSPECIFIED TYPE: ICD-10-CM

## 2024-04-22 DIAGNOSIS — G47.62 NOCTURNAL LEG CRAMPS: ICD-10-CM

## 2024-04-22 DIAGNOSIS — I10 PRIMARY HYPERTENSION: ICD-10-CM

## 2024-04-22 DIAGNOSIS — I42.0 DILATED CARDIOMYOPATHY (MULTI): ICD-10-CM

## 2024-04-22 DIAGNOSIS — E66.9 OBESITY, CLASS II, BMI 35-39.9: ICD-10-CM

## 2024-04-22 PROCEDURE — 3078F DIAST BP <80 MM HG: CPT | Performed by: NURSE PRACTITIONER

## 2024-04-22 PROCEDURE — 99204 OFFICE O/P NEW MOD 45 MIN: CPT | Performed by: PSYCHIATRY & NEUROLOGY

## 2024-04-22 PROCEDURE — 3074F SYST BP LT 130 MM HG: CPT | Performed by: NURSE PRACTITIONER

## 2024-04-22 PROCEDURE — 99213 OFFICE O/P EST LOW 20 MIN: CPT | Performed by: NURSE PRACTITIONER

## 2024-04-22 PROCEDURE — 1036F TOBACCO NON-USER: CPT | Performed by: PSYCHIATRY & NEUROLOGY

## 2024-04-22 PROCEDURE — 1036F TOBACCO NON-USER: CPT | Performed by: NURSE PRACTITIONER

## 2024-04-22 RX ORDER — CYCLOBENZAPRINE HCL 10 MG
10 TABLET ORAL EVERY 8 HOURS PRN
COMMUNITY

## 2024-04-22 RX ORDER — CALCIUM CARBONATE 300MG(750)
1 TABLET,CHEWABLE ORAL NIGHTLY
COMMUNITY

## 2024-04-22 RX ORDER — LOSARTAN POTASSIUM AND HYDROCHLOROTHIAZIDE 12.5; 5 MG/1; MG/1
1 TABLET ORAL DAILY
Qty: 90 TABLET | Refills: 3 | Status: SHIPPED | OUTPATIENT
Start: 2024-04-22 | End: 2025-04-22

## 2024-04-22 ASSESSMENT — PATIENT HEALTH QUESTIONNAIRE - PHQ9
1. LITTLE INTEREST OR PLEASURE IN DOING THINGS: NOT AT ALL
2. FEELING DOWN, DEPRESSED OR HOPELESS: NOT AT ALL
SUM OF ALL RESPONSES TO PHQ9 QUESTIONS 1 AND 2: 0

## 2024-04-22 NOTE — PROGRESS NOTES
Stalin Shah is a 64 y.o. male     History Of Present Illness   Mr Shah is a 64 year old morbidly obese male with dilated cardiomyopathy, T2DM with neuropathy, hypertension, hyperlipidemia, MATTHEW, BPH, chronic back pain, here for a follow up of his hypertension, hyperlipidemia, dilated cardiomyopathy . He denies any complaints of chest pain, shortness of breath, palpitations, dizziness or syncope.  He does complain of right shoulder and hip pain since his neck surgery.  He is scheduled for a CT scan and Mri for further evaluation. This will be his last appt with us as he is moving to [place] at the end of this month.      Social HX  Social History     Tobacco Use    Smoking status: Never    Smokeless tobacco: Never          Family HX  Family History   Problem Relation Name Age of Onset    Cancer Mother           at 73 from blood clot after surgery    Diabetes Father           at 57 from complications from diabetes    Sleep apnea Brother Jason         was morbidly obese          Review Of Systems   Constitutional: not feeling tired.   Eyes: no eyesight problems.  No vision loss or change in vision  ENT: no hearing loss and no nosebleeds.   Cardiovascular: No intermittent leg claudication,   No chest pain, no tightness or heavy pressure  No shortness of breath,  No palpitations,  No lower extremity edema  The heart rate is regular  Respiratory: no chronic cough and no shortness of breath.   Gastrointestinal: no change in bowel habits and no blood in stools.   Genitourinary: no urinary frequency.   Skin: no skin rashes.   Neurological: No frequent falls.   No dizziness  No weakness  Denies headaches  Psychiatric: no depression and not suicidal.   All other systems have been reviewed and are negative for complaint.        Allergies  Allergies   Allergen Reactions    Lisinopril Unknown    Sitagliptin GI Upset          Vitals  There were no vitals taken for this visit.        Physical Exam  Constitutional:  alert and in no acute distress.   Eyes: no erythema, swelling or discharge from the eye .   Neck: neck is supple, symmetric, trachea midline, no masses  and no thyromegaly .   Pulmonary: No increased work of breathing or signs of respiratory distress    Lungs clear to auscultation.    No friction rub.  Cardiovascular: carotid pulses 2+ bilaterally with no bruit    JVP was normal, no thrills ,   Regular rhythm, normal S1 and S2, no murmurs    Pedal pulses 2+ bilaterally    No edema .   Abdomen: abdomen non-tender, no masses  and no hepatomegaly . No pulsatile mass noted  Skin: skin warm and dry, normal skin turgor .   Psychiatric judgment and insight is normal  and oriented to person, place and time .           Current/Home Meds    Current Outpatient Medications:     cyclobenzaprine (Flexeril) 10 mg tablet, Take 1 tablet (10 mg) by mouth every 8 hours if needed for muscle spasms., Disp: , Rfl:     glimepiride (Amaryl) 1 mg tablet, TAKE 1 TABLET DAILY, Disp: 90 tablet, Rfl: 3    losartan-hydrochlorothiazide (Hyzaar) 50-12.5 mg tablet, TAKE 1 TABLET TWICE A DAY, Disp: 180 tablet, Rfl: 3    magnesium oxide (Mag-Ox) 400 mg tablet, Take 1 tablet (400 mg) by mouth once daily at bedtime., Disp: , Rfl:     metFORMIN (Glucophage) 1,000 mg tablet, Take 1 tablet (1,000 mg) by mouth 2 times a day with meals., Disp: , Rfl:     morphine CR (MS Contin) 15 mg 12 hr tablet, 1 tablet (15 mg)., Disp: , Rfl:        Labs                 Cardiac Service Results:  No results found for this or any previous visit from the past 365 days.        Assessment/Plan      CAD:  Stable with no complaints of chest pain or shortness of breath.  His most recent LDL was 117.  His BP is well controlled at 114/75.  His most recent echo showed a low normal EF.  He will be moving to [place] at the end of this month.  He is to continue losartan-hydrochlorothiazide.

## 2024-09-10 ENCOUNTER — TELEPHONE (OUTPATIENT)
Dept: SLEEP MEDICINE | Facility: CLINIC | Age: 64
End: 2024-09-10
Payer: COMMERCIAL

## 2024-09-10 NOTE — TELEPHONE ENCOUNTER
Patient called states his CPAP pressure is extremely low.  [place]. Patient advised to change filters and has ordered them off "[employer].    Requested CPAP DL from [facility] for Dr. Miller to review.

## 2024-09-10 NOTE — TELEPHONE ENCOUNTER
I called the pt back. He is now living in [place]. He states that the filters in his CPAP machine are filled with a white dust and he wakes up with a chalky taste in his mouth. He recently bought a humidifier machine for his bedroom. There may be water inside his machine, as well. He feels that the pressure coming from the machine is now too low. He has new filters on the way and he will let his machine dry out. He will let us know if his issue persists and if he wants us to increase his pressure setting. I would likely change his minimum pressure from 8 cm H2O to 10, 11, or 12 cm H2O. His current setting is 8-18 cm H2O with ramp starting at 4 cm H2O with a 30 minute ramp.    CPAP download:  Date range: 24-9/3/24  Days used: 70%  Days used >4 hours: 27%  Average usage (days used): 2 h 24 min  Settin-18 cm H2O  Pressure: 90th %ile 12.6 cm H2O, mean 10.1 cm H2O, max 12.9 cm H2O  Avg time per day in large leak: 14 sec  AHI: 7.3        Download